# Patient Record
Sex: FEMALE | Race: WHITE | URBAN - METROPOLITAN AREA
[De-identification: names, ages, dates, MRNs, and addresses within clinical notes are randomized per-mention and may not be internally consistent; named-entity substitution may affect disease eponyms.]

---

## 2021-11-23 ENCOUNTER — OFFICE VISIT (OUTPATIENT)
Dept: URBAN - METROPOLITAN AREA CLINIC 48 | Facility: CLINIC | Age: 61
End: 2021-11-23
Payer: COMMERCIAL

## 2021-11-23 DIAGNOSIS — S06.0X9S CONCUSSION W/ LOC, SEQUELA: ICD-10-CM

## 2021-11-23 DIAGNOSIS — S05.92XA INJURY OF LEFT EYE, INITIAL ENCOUNTER: Primary | ICD-10-CM

## 2021-11-23 DIAGNOSIS — H26.493 OTHER SECONDARY CATARACT, BILATERAL: ICD-10-CM

## 2021-11-23 PROCEDURE — 92004 COMPRE OPH EXAM NEW PT 1/>: CPT | Performed by: OPHTHALMOLOGY

## 2021-11-23 ASSESSMENT — INTRAOCULAR PRESSURE
OD: 17
OS: 17

## 2021-11-23 NOTE — IMPRESSION/PLAN
Impression: Concussion w/ LOC, sequela: S06.0X9S.

full t finger count in all clock hours, do not suspect VF loss. EOM intact and full. Plan: discussed with  patient not directly related to her eye care but recommend he work with Neurology ad PCP to work on concussion sequela she is experiencing. There is no double vision in either eye. Patient to follow up for concussion sequela  with [facility] or Neurology.

## 2021-11-23 NOTE — IMPRESSION/PLAN
Impression: Injury of left eye, initial encounter: S05. 92XA. Patient has history of thin Retinas, RD precautions discussed with patient. Plan: Patient had dilated exam done at [facility] . PF qd x 10 days then stop Patient has history of cornea problems, being seen in [facility] Patient to use Genteal gel drops RTC 1st week in January follow up for cornea RTC 3 months DFE unless being seen in [facility]

## 2023-04-13 ENCOUNTER — OFFICE VISIT (OUTPATIENT)
Dept: URBAN - METROPOLITAN AREA CLINIC 48 | Facility: CLINIC | Age: 63
End: 2023-04-13
Payer: COMMERCIAL

## 2023-04-13 DIAGNOSIS — H52.13 MYOPIA, BILATERAL: ICD-10-CM

## 2023-04-13 DIAGNOSIS — H35.30 UNSPECIFIED MACULAR DEGENERATION: Primary | ICD-10-CM

## 2023-04-13 DIAGNOSIS — H17.9 CORNEAL SCAR: ICD-10-CM

## 2023-04-13 PROCEDURE — 92134 CPTRZ OPH DX IMG PST SGM RTA: CPT | Performed by: OPHTHALMOLOGY

## 2023-04-13 PROCEDURE — 67028 INJECTION EYE DRUG: CPT | Performed by: OPHTHALMOLOGY

## 2023-04-13 PROCEDURE — 99214 OFFICE O/P EST MOD 30 MIN: CPT | Performed by: OPHTHALMOLOGY

## 2023-04-13 ASSESSMENT — INTRAOCULAR PRESSURE
OS: 10
OD: 10

## 2023-04-13 NOTE — IMPRESSION/PLAN
Impression: Myopia, bilateral: H52.13. Plan: High Myopia noted Bilaterally Patient reports being very near sighted prior to [address]. Patient has GCL thinning on OCT mac and thinning OCT ON that may be attributed to patient history of Myopia.

## 2023-04-13 NOTE — IMPRESSION/PLAN
Impression: Unspecified macular degeneration: H35.30. Plan: Mild myopic changes OD noted. OS has myopic changes with macula elevation and SRF and retina thickening Patient has new SRF and cystoid changes noted OS related to age and/or myopic degeneration. Recommend anti VEGF treatment x2 in OS w/ 3wk DE/OCT Please schedule:
Eylea SAMPLE OS x1
to be done: TODAY
then PLEASE GET AUTH for Eylea OS x1 - 
can use AVASTIN if Eylea NOT approved
to be done: 05/12
w/ 3wk DE/OCT Mac

## 2023-05-12 ENCOUNTER — PROCEDURE (OUTPATIENT)
Dept: URBAN - METROPOLITAN AREA CLINIC 48 | Facility: CLINIC | Age: 63
End: 2023-05-12
Payer: COMMERCIAL

## 2023-05-12 DIAGNOSIS — H35.30 UNSPECIFIED MACULAR DEGENERATION: Primary | ICD-10-CM

## 2023-05-12 PROCEDURE — 67028 INJECTION EYE DRUG: CPT | Performed by: OPHTHALMOLOGY

## 2023-06-02 ENCOUNTER — OFFICE VISIT (OUTPATIENT)
Dept: URBAN - METROPOLITAN AREA CLINIC 48 | Facility: CLINIC | Age: 63
End: 2023-06-02
Payer: COMMERCIAL

## 2023-06-02 DIAGNOSIS — H35.3220 EXUDATIVE AGE-RELATED MACULAR DEGENERATION, LEFT EYE, STAGE UNSPECIFIED: Primary | ICD-10-CM

## 2023-06-02 DIAGNOSIS — H52.13 MYOPIA, BILATERAL: ICD-10-CM

## 2023-06-02 PROCEDURE — 92134 CPTRZ OPH DX IMG PST SGM RTA: CPT | Performed by: OPHTHALMOLOGY

## 2023-06-02 PROCEDURE — 99214 OFFICE O/P EST MOD 30 MIN: CPT | Performed by: OPHTHALMOLOGY

## 2023-06-02 ASSESSMENT — INTRAOCULAR PRESSURE
OS: 14
OD: 14

## 2023-06-02 NOTE — IMPRESSION/PLAN
Impression: Myopia, bilateral: H52.13. Patient reports being very near sighted prior to [address]. Patient has GCL thinning on OCT mac and thinning OCT ON that may be attributed to patient history of Myopia Plan: High Myopia noted Bilaterally Sean Raphael

## 2023-06-02 NOTE — IMPRESSION/PLAN
Impression: Exudative age-related macular degeneration, left eye, stage unspecified: H35.3220. Plan: Macular degeneration, wet type, appears stable though recently active. No treatment required at this time, patient instructed to contact office immediately with any changes. Patient to be added same or next day if VA changes are noted RTC 2mo DE/OCT
**if no recurrence next visit, will stretch visits out to [facility] AND [facility] [place]

## 2023-08-08 ENCOUNTER — OFFICE VISIT (OUTPATIENT)
Dept: URBAN - METROPOLITAN AREA CLINIC 48 | Facility: CLINIC | Age: 63
End: 2023-08-08
Payer: COMMERCIAL

## 2023-08-08 DIAGNOSIS — H35.3222 EXUDATIVE MACULAR DEGENERATION, WITH INACTIVE CHOROIDAL NEOVASCULARIZATION, LEFT EYE: Primary | ICD-10-CM

## 2023-08-08 DIAGNOSIS — H44.23 DEGENERATIVE MYOPIA, BILATERAL: ICD-10-CM

## 2023-08-08 PROCEDURE — 99214 OFFICE O/P EST MOD 30 MIN: CPT | Performed by: OPHTHALMOLOGY

## 2023-08-08 ASSESSMENT — INTRAOCULAR PRESSURE
OS: 14
OD: 13

## 2024-03-28 ENCOUNTER — OFFICE VISIT (OUTPATIENT)
Dept: URBAN - METROPOLITAN AREA CLINIC 48 | Facility: CLINIC | Age: 64
End: 2024-03-28
Payer: COMMERCIAL

## 2024-03-28 DIAGNOSIS — H35.3222 EXUDATIVE MACULAR DEGENERATION, WITH INACTIVE CHOROIDAL NEOVASCULARIZATION, LEFT EYE: Primary | ICD-10-CM

## 2024-03-28 DIAGNOSIS — H44.23 DEGENERATIVE MYOPIA, BILATERAL: ICD-10-CM

## 2024-03-28 DIAGNOSIS — H17.9 CORNEAL SCAR: ICD-10-CM

## 2024-03-28 DIAGNOSIS — H26.493 OTHER SECONDARY CATARACT, BILATERAL: ICD-10-CM

## 2024-03-28 PROCEDURE — 92134 CPTRZ OPH DX IMG PST SGM RTA: CPT | Performed by: OPHTHALMOLOGY

## 2024-03-28 PROCEDURE — 99214 OFFICE O/P EST MOD 30 MIN: CPT | Performed by: OPHTHALMOLOGY

## 2024-03-28 ASSESSMENT — INTRAOCULAR PRESSURE
OS: 14
OD: 16

## 2024-09-05 ENCOUNTER — OFFICE VISIT (OUTPATIENT)
Dept: URBAN - METROPOLITAN AREA CLINIC 48 | Facility: CLINIC | Age: 64
End: 2024-09-05
Payer: COMMERCIAL

## 2024-09-05 DIAGNOSIS — H35.3220 EXUDATIVE AGE-RELATED MACULAR DEGENERATION, LEFT EYE, STAGE UNSPECIFIED: Primary | ICD-10-CM

## 2024-09-05 PROCEDURE — 99214 OFFICE O/P EST MOD 30 MIN: CPT | Performed by: OPHTHALMOLOGY

## 2024-09-05 PROCEDURE — 92134 CPTRZ OPH DX IMG PST SGM RTA: CPT | Performed by: OPHTHALMOLOGY

## 2024-09-05 ASSESSMENT — INTRAOCULAR PRESSURE
OS: 15
OD: 13

## 2024-09-13 ENCOUNTER — OFFICE VISIT (OUTPATIENT)
Dept: URBAN - METROPOLITAN AREA CLINIC 48 | Facility: CLINIC | Age: 64
End: 2024-09-13
Payer: COMMERCIAL

## 2024-09-13 DIAGNOSIS — H35.3220 EXUDATIVE AGE-RELATED MACULAR DEGENERATION, LEFT EYE, STAGE UNSPECIFIED: Primary | ICD-10-CM

## 2024-09-13 PROCEDURE — 67028 INJECTION EYE DRUG: CPT | Performed by: OPHTHALMOLOGY

## 2024-10-03 ENCOUNTER — OFFICE VISIT (OUTPATIENT)
Dept: URBAN - METROPOLITAN AREA CLINIC 48 | Facility: CLINIC | Age: 64
End: 2024-10-03
Payer: COMMERCIAL

## 2024-10-03 DIAGNOSIS — H26.493 OTHER SECONDARY CATARACT, BILATERAL: ICD-10-CM

## 2024-10-03 DIAGNOSIS — H35.3222 EXUDATIVE MACULAR DEGENERATION, WITH INACTIVE CHOROIDAL NEOVASCULARIZATION, LEFT EYE: Primary | ICD-10-CM

## 2024-10-03 DIAGNOSIS — H44.23 DEGENERATIVE MYOPIA, BILATERAL: ICD-10-CM

## 2024-10-03 PROCEDURE — 99214 OFFICE O/P EST MOD 30 MIN: CPT | Performed by: OPHTHALMOLOGY

## 2024-10-03 ASSESSMENT — INTRAOCULAR PRESSURE
OS: 11
OD: 11

## 2025-04-24 ENCOUNTER — OFFICE VISIT (OUTPATIENT)
Dept: URBAN - METROPOLITAN AREA CLINIC 48 | Facility: CLINIC | Age: 65
End: 2025-04-24
Payer: COMMERCIAL

## 2025-04-24 DIAGNOSIS — H40.012 OPEN ANGLE WITH BORDERLINE FINDINGS, LOW RISK, LEFT EYE: Primary | ICD-10-CM

## 2025-04-24 DIAGNOSIS — H44.23 DEGENERATIVE MYOPIA, BILATERAL: ICD-10-CM

## 2025-04-24 DIAGNOSIS — H35.3222 EXUDATIVE MACULAR DEGENERATION, WITH INACTIVE CHOROIDAL NEOVASCULARIZATION, LEFT EYE: ICD-10-CM

## 2025-04-24 DIAGNOSIS — H52.4 PRESBYOPIA: ICD-10-CM

## 2025-04-24 DIAGNOSIS — H26.493 OTHER SECONDARY CATARACT, BILATERAL: ICD-10-CM

## 2025-04-24 PROCEDURE — 92134 CPTRZ OPH DX IMG PST SGM RTA: CPT | Performed by: OPHTHALMOLOGY

## 2025-04-24 PROCEDURE — 92015 DETERMINE REFRACTIVE STATE: CPT | Performed by: OPHTHALMOLOGY

## 2025-04-24 PROCEDURE — 99214 OFFICE O/P EST MOD 30 MIN: CPT | Performed by: OPHTHALMOLOGY

## 2025-04-24 ASSESSMENT — KERATOMETRY
OD: 43.13
OS: 41.50

## 2025-04-24 ASSESSMENT — VISUAL ACUITY: OD: 20/25

## 2025-04-24 ASSESSMENT — INTRAOCULAR PRESSURE
OS: 15
OD: 15

## 2025-07-17 ENCOUNTER — OFFICE VISIT (OUTPATIENT)
Dept: URBAN - METROPOLITAN AREA CLINIC 48 | Facility: CLINIC | Age: 65
End: 2025-07-17
Payer: COMMERCIAL

## 2025-07-17 DIAGNOSIS — H40.012 OPEN ANGLE WITH BORDERLINE FINDINGS, LOW RISK, LEFT EYE: Primary | ICD-10-CM

## 2025-07-17 DIAGNOSIS — H17.9 CORNEAL SCAR: ICD-10-CM

## 2025-07-17 DIAGNOSIS — H35.3222 EXUDATIVE MACULAR DEGENERATION, WITH INACTIVE CHOROIDAL NEOVASCULARIZATION, LEFT EYE: ICD-10-CM

## 2025-07-17 PROCEDURE — 99214 OFFICE O/P EST MOD 30 MIN: CPT | Performed by: OPHTHALMOLOGY

## 2025-07-17 RX ORDER — BRIMONIDINE TARTRATE 2 MG/ML
0.2 % SOLUTION/ DROPS OPHTHALMIC
Qty: 10 | Refills: 2 | Status: ACTIVE
Start: 2025-07-17

## 2025-07-17 ASSESSMENT — INTRAOCULAR PRESSURE
OD: 11
OS: 10